# Patient Record
Sex: MALE | Race: WHITE | Employment: OTHER | URBAN - METROPOLITAN AREA
[De-identification: names, ages, dates, MRNs, and addresses within clinical notes are randomized per-mention and may not be internally consistent; named-entity substitution may affect disease eponyms.]

---

## 2018-02-25 ENCOUNTER — HOSPITAL ENCOUNTER (EMERGENCY)
Age: 78
Discharge: HOME | End: 2018-02-25
Attending: EMERGENCY MEDICINE
Payer: MEDICARE

## 2018-02-25 VITALS
HEART RATE: 64 BPM | RESPIRATION RATE: 16 BRPM | DIASTOLIC BLOOD PRESSURE: 95 MMHG | WEIGHT: 243.61 LBS | OXYGEN SATURATION: 95 % | BODY MASS INDEX: 31.28 KG/M2 | TEMPERATURE: 98.4 F | SYSTOLIC BLOOD PRESSURE: 178 MMHG

## 2018-02-25 DIAGNOSIS — H10.33 ACUTE CONJUNCTIVITIS OF BOTH EYES, UNSPECIFIED ACUTE CONJUNCTIVITIS TYPE: ICD-10-CM

## 2018-02-25 DIAGNOSIS — H04.301 DACRYOCYSTITIS OF RIGHT LACRIMAL SAC: Primary | ICD-10-CM

## 2018-02-25 DIAGNOSIS — S16.1XXA STRAIN OF NECK MUSCLE, INITIAL ENCOUNTER: ICD-10-CM

## 2018-02-25 PROCEDURE — 99283 EMERGENCY DEPT VISIT LOW MDM: CPT

## 2018-02-25 PROCEDURE — 74011250637 HC RX REV CODE- 250/637: Performed by: EMERGENCY MEDICINE

## 2018-02-25 RX ORDER — ERYTHROMYCIN 5 MG/G
OINTMENT OPHTHALMIC
Status: COMPLETED | OUTPATIENT
Start: 2018-02-25 | End: 2018-02-25

## 2018-02-25 RX ORDER — IBUPROFEN 600 MG/1
600 TABLET ORAL
Status: COMPLETED | OUTPATIENT
Start: 2018-02-25 | End: 2018-02-25

## 2018-02-25 RX ORDER — TAMSULOSIN HYDROCHLORIDE 0.4 MG/1
0.4 CAPSULE ORAL DAILY
COMMUNITY

## 2018-02-25 RX ORDER — CLINDAMYCIN HYDROCHLORIDE 300 MG/1
300 CAPSULE ORAL 4 TIMES DAILY
Qty: 28 CAP | Refills: 0 | Status: SHIPPED | OUTPATIENT
Start: 2018-02-25 | End: 2018-03-04

## 2018-02-25 RX ORDER — ERYTHROMYCIN 5 MG/G
OINTMENT OPHTHALMIC
Qty: 3.5 G | Refills: 0 | Status: SHIPPED | OUTPATIENT
Start: 2018-02-25

## 2018-02-25 RX ORDER — CLINDAMYCIN HYDROCHLORIDE 150 MG/1
300 CAPSULE ORAL
Status: COMPLETED | OUTPATIENT
Start: 2018-02-25 | End: 2018-02-25

## 2018-02-25 RX ORDER — METHOCARBAMOL 750 MG/1
750 TABLET, FILM COATED ORAL
Qty: 5 TAB | Refills: 0 | Status: SHIPPED | OUTPATIENT
Start: 2018-02-25 | End: 2018-03-02

## 2018-02-25 RX ADMIN — ERYTHROMYCIN: 5 OINTMENT OPHTHALMIC at 06:32

## 2018-02-25 RX ADMIN — IBUPROFEN 600 MG: 600 TABLET, FILM COATED ORAL at 06:41

## 2018-02-25 RX ADMIN — CLINDAMYCIN HYDROCHLORIDE 300 MG: 150 CAPSULE ORAL at 06:17

## 2018-02-25 NOTE — Clinical Note
- Dr. 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 
- Return to ED for fever, increased swelling, increased pain, any other concerns. - Motrin for neck pain. Robaxin for muscle relaxant. Do not drive if taking Robaxin.

## 2018-02-25 NOTE — DISCHARGE INSTRUCTIONS
DACRYOCYSTITIS  The lacrimal drainage system consists of the puncta (upper and lower within the eyelids) which are the opening to the upper and lower canaliculus. These meet at the common canaliculus and open into the lacrimal (tear) sac. This runs parallel to the nose and is  from the middle meatus of the nasal cavity by two thin plates of bone. It continues down to become the nasolacrimal duct (tear duct) which opens out into the inferior nasal meatus. Dacryocystitis[1]  Dacryocystitis is an inflammation of the lacrimal sac, often as a result of infection. It may be acute or chronic. For anatomical reasons, it occurs more frequently on the left side. An ocular origin for inflammation of the lacrimal system is less common than a nasal origin. Rarely, congenital dacryocystitis can occur. One study reported a birth prevalence of 1 in 3,884. [2]This is a serious condition because the orbital septum is poorly formed in infants and there is a significant risk of spread (orbital cellulitis and its complications). Epidemiology  It is more common in females. It tends to occur either in infants (uncommon) or in adults (much more common) over the age of 36 years, peak age 64-70 years. Presentation  Acute dacryocystitis  Symptoms and signs are over the region of the lacrimal sac (but may spread to the nose and face with teeth pain being experienced by some). Therefore, look just lateral and below the bridge of the nose for:   Excess tears (epiphora) - almost invariably. Pain. Redness. Swelling. The patient may complain of decreased visual acuity owing to the excess tears and an abnormal tear composition. [3]  Examination will reveal a tender, tense, red swelling (± preseptal cellulitis in severe cases). Mucopurulent discharge can be expressed from the punctum. There may be a fever and an elevated leukocyte count too.   Chronic dacryocystitis[4]   This may present with a history of chronic or recurring epiphora and may have persistent redness of the medial canthus. There may be a painless or recurring swelling over the lacrimal sac, and pressure over this will result in reflux of mucopurulent material through the lower punctum. Differential diagnosis  Orbital or facial cellulitis (discharge cannot be expressed from the punctum). Acute ethmoid or frontal sinusitis. Dacryocystocele (mild enlargement of a non-inflamed lacrimal sac in an infant). Investigations  In severe or atypical cases (eg, non-responsive to antibiotics), culture the expressed contents of the sac. CT scan of the orbit and the paranasal sinuses can be useful. A dacryocystography (DCG) may be performed where structural abnormalities are suspected. Associated diseases[3]  This is most commonly associated with nasolacrimal duct obstruction which results in stasis of the lacrimal sac contents. Less commonly, it is associated with anatomical abnormalities of the lacrimal sac or with nasal or sinus surgery. Nasal disease may be found in a number of these patients - eg, various forms of rhinitis, trauma or the presence of a foreign body. Rarely, there may be a lacrimal sac tumour. Management  Acute dacryocystitis management  Patients tend to be managed on an outpatient basis unless they are systemically unwell. Initially, treatment of acute dacryocystitis is with oral antibiotics and analgesia. Examples include:   Children - co-amoxiclav or cefaclor. [5]   Adults - co-amoxiclav or cefalexin (although be guided by microbiology results. Whilst Staphylococcus aureus is still the main cause, Gram-negative organisms are increasingly isolated). [6]  The regime is guided by clinical response but, typically, a 10- to 14-day course is required. Incision and drainage may be considered if the infection extends outside the sac and a superficial skin abscess is formed.  However, this carries the risk of forming a fistula, resulting in tears draining directly to the skin surface.[7]   Once the infection has settled and in chronic cases, a dacryocystorhinostomy (DCR) is performed. [8]  Chronic dacryocystitis management[9, 10]  Non-surgical treatment involves warm compresses, massage and probing of the nasolacrimal duct. Probing involves inserting a fine metal probe via the punctum and canalicular system and passing it into the nasolacrimal sac, past the obstruction. This can often be done without a general anaesthetic. If there is acute infection, the procedure is usually deferred for a few days, until antibiotics have taken effect. [11]   If this is not effective, surgical treatment is a DCR (see box below). Balloon dacryoplasty has become popular in the last few years, but may have lower success rates in the long term. It is suitable for patients with focal stenoses or occlusions of the nasolacrimal duct. [12]  Congenital dacryocystocele management[13]  One study reported that conservative management, involving gentle massage over the lacrimal sac, warm compresses ± topical and systemic antibiotics, was effective in all cases. Probing and irrigation may resolve the obstruction. Be aware that serious infection can develop rapidly       Pinkeye: Care Instructions  Your Care Instructions    Pinkeye is redness and swelling of the eye surface and the conjunctiva (the lining of the eyelid and the covering of the white part of the eye). Pinkeye is also called conjunctivitis. Pinkeye is often caused by infection with bacteria or a virus. Dry air, allergies, smoke, and chemicals are other common causes. Pinkeye often clears on its own in 7 to 10 days. Antibiotics only help if the pinkeye is caused by bacteria. Pinkeye caused by infection spreads easily. If an allergy or chemical is causing pinkeye, it will not go away unless you can avoid whatever is causing it. Follow-up care is a key part of your treatment and safety.  Be sure to make and go to all appointments, and call your doctor if you are having problems. It's also a good idea to know your test results and keep a list of the medicines you take. How can you care for yourself at home? · Wash your hands often. Always wash them before and after you treat pinkeye or touch your eyes or face. · Use moist cotton or a clean, wet cloth to remove crust. Wipe from the inside corner of the eye to the outside. Use a clean part of the cloth for each wipe. · Put cold or warm wet cloths on your eye a few times a day if the eye hurts. · Do not wear contact lenses or eye makeup until the pinkeye is gone. Throw away any eye makeup you were using when you got pinkeye. Clean your contacts and storage case. If you wear disposable contacts, use a new pair when your eye has cleared and it is safe to wear contacts again. · If the doctor gave you antibiotic ointment or eyedrops, use them as directed. Use the medicine for as long as instructed, even if your eye starts looking better soon. Keep the bottle tip clean, and do not let it touch the eye area. · To put in eyedrops or ointment:  ¨ Tilt your head back, and pull your lower eyelid down with one finger. ¨ Drop or squirt the medicine inside the lower lid. ¨ Close your eye for 30 to 60 seconds to let the drops or ointment move around. ¨ Do not touch the ointment or dropper tip to your eyelashes or any other surface. · Do not share towels, pillows, or washcloths while you have pinkeye. When should you call for help? Call your doctor now or seek immediate medical care if:  ? · You have pain in your eye, not just irritation on the surface. ? · You have a change in vision or loss of vision. ? · You have an increase in discharge from the eye.   ? · Your eye has not started to improve or begins to get worse within 48 hours after you start using antibiotics. ? · Pinkeye lasts longer than 7 days. ? Watch closely for changes in your health, and be sure to contact your doctor if you have any problems. Where can you learn more? Go to http://yonas-asher.info/. Enter Y392 in the search box to learn more about \"Marj: Care Instructions. \"  Current as of: March 20, 2017  Content Version: 11.4  © 0402-9618 [employer]. Care instructions adapted under license by [employer] (which disclaims liability or warranty for this information). If you have questions about a medical condition or this instruction, always ask your healthcare professional. [place] [address] any warranty or liability for your use of this information. Neck Strain: Care Instructions  Your Care Instructions    You have strained the muscles and ligaments in your neck. A sudden, awkward movement can strain the neck. This often occurs with falls or car accidents or during certain sports. Everyday activities like working on a computer or sleeping can also cause neck strain if they force you to hold your neck in an awkward position for a long time. It is common for neck pain to get worse for a day or two after an injury, but it should start to feel better after that. You may have more pain and stiffness for several days before it gets better. This is expected. It may take a few weeks or longer for it to heal completely. Good home treatment can help you get better faster and avoid future neck problems. Follow-up care is a key part of your treatment and safety. Be sure to make and go to all appointments, and call your doctor if you are having problems. It's also a good idea to know your test results and keep a list of the medicines you take. How can you care for yourself at home? · If you were given a neck brace (cervical collar) to limit neck motion, wear it as instructed for as many days as your doctor tells you to. Do not wear it longer than you were told to. Wearing a brace for too long can make neck stiffness worse and weaken the neck muscles.   · You can try using heat or ice to see if it helps. ¨ Try using a heating pad on a low or medium setting for 15 to 20 minutes every 2 to 3 hours. Try a warm shower in place of one session with the heating pad. You can also buy single-use heat wraps that last up to 8 hours. ¨ You can also try an ice pack for 10 to 15 minutes every 2 to 3 hours. · Take pain medicines exactly as directed. ¨ If the doctor gave you a prescription medicine for pain, take it as prescribed. ¨ If you are not taking a prescription pain medicine, ask your doctor if you can take an over-the-counter medicine. · Gently rub the area to relieve pain and help with blood flow. Do not massage the area if it hurts to do so. · Do not do anything that makes the pain worse. Take it easy for a couple of days. You can do your usual activities if they do not hurt your neck or put it at risk for more stress or injury. · Try sleeping on a special neck pillow. Place it under your neck, not under your head. Placing a tightly rolled-up towel under your neck while you sleep will also work. If you use a neck pillow or rolled towel, do not use your regular pillow at the same time. · To prevent future neck pain, do exercises to stretch and strengthen your neck and back. Learn how to use good posture, safe lifting techniques, and proper body mechanics. When should you call for help? Call 911 anytime you think you may need emergency care. For example, call if:  ? · You are unable to move an arm or a leg at all. ?Call your doctor now or seek immediate medical care if:  ? · You have new or worse symptoms in your arms, legs, chest, belly, or buttocks. Symptoms may include:  ¨ Numbness or tingling. ¨ Weakness. ¨ Pain. ? · You lose bladder or bowel control. ? Watch closely for changes in your health, and be sure to contact your doctor if:  ? · You are not getting better as expected. Where can you learn more? Go to http://dakota.info/.   Enter M253 in the search box to learn more about \"Neck Strain: Care Instructions. \"  Current as of: March 21, 2017  Content Version: 11.4  © 8213-0649 [employer]. Care instructions adapted under license by [employer] (which disclaims liability or warranty for this information). If you have questions about a medical condition or this instruction, always ask your healthcare professional. [place] [address] any warranty or liability for your use of this information.

## 2018-02-25 NOTE — ED PROVIDER NOTES
HPI Comments: The patient presents to the ED with R eye pain, swelling and drainage. Symptoms have been present for 3-4 days. He saw an ophthalmologist at a ball game last night and was told to go to the ED. He does have an appointment with [facility] tomorrow. He denies decreased vision. He notes purulent drainage from the eye. He has swelling to the medial eye. No meds were taken PTA. He wears glasses. He does not wear contacts. He also notes L sided neck strain \"in my trapezius\" beginning today. Pain is 6/10. No meds have been taken for pain. He drove himself to the ED. He has no other complaints. Patient is a 78 y.o. male presenting with eye discharge. The history is provided by the patient. Eye Drainage    Associated symptoms include discharge, eye redness and pain. Pertinent negatives include no photophobia. Past Medical History:   Diagnosis Date    Arthritis     CAD (coronary artery disease)     Stent in coronary artery    Chronic pain     Nausea & vomiting     Other ill-defined conditions(799.89)     High Cholesterol    Other ill-defined conditions(799.89)     Frequent Kidney Stones    Other ill-defined conditions(799.89)     Diverticulitis    Other ill-defined conditions(799.89)     Essential Tremor    Psychiatric disorder     Bipolar    Unspecified sleep apnea        Past Surgical History:   Procedure Laterality Date    CARDIAC SURG PROCEDURE UNLIST      Cardiac stent    HX ORTHOPAEDIC      Bilateral Knee replacement    HX OTHER SURGICAL      Surgical repair of renal artery    HX UROLOGICAL      Frequent lithotripsy    HX UROLOGICAL  2002    Percutaneous kidney stone removal         History reviewed. No pertinent family history. Social History     Social History    Marital status:      Spouse name: N/A    Number of children: N/A    Years of education: N/A     Occupational History    Not on file.      Social History Main Topics    Smoking status: Former Smoker    Smokeless tobacco: Not on file    Alcohol use No    Drug use: No    Sexual activity: Not on file     Other Topics Concern    Not on file     Social History Narrative         ALLERGIES: Levaquin [levofloxacin]    Review of Systems   Eyes: Positive for pain, discharge and redness. Negative for photophobia, itching and visual disturbance. Musculoskeletal: Positive for neck pain. Vitals:    02/25/18 0551   BP: (!) 178/95   Pulse: 64   Resp: 16   Temp: 98.4 °F (36.9 °C)   SpO2: 95%   Weight: 110.5 kg (243 lb 9.7 oz)            Physical Exam   Constitutional: He is oriented to person, place, and time. He appears well-developed and well-nourished. HENT:   Head: Normocephalic and atraumatic. Mouth/Throat: Oropharynx is clear and moist.   Eyes: EOM are normal. Pupils are equal, round, and reactive to light. Swelling, erythema, tenderness over lacrimal duct of R eye. Purulent drainage from R>>L eye. Conjunctiva of R eye is erythematous. Neck: Normal range of motion. Neck supple. Mild TTP over L trapezius. Cardiovascular: Normal rate, regular rhythm and normal heart sounds. Pulmonary/Chest: Effort normal and breath sounds normal.   Abdominal: Soft. Bowel sounds are normal. There is no tenderness. Musculoskeletal: Normal range of motion. He exhibits no edema or tenderness. Neurological: He is alert and oriented to person, place, and time. Skin: Skin is warm and dry. Psychiatric: He has a normal mood and affect. His behavior is normal.   Nursing note and vitals reviewed. [facility]      ED Course       Procedures                           CONSULT:  Dr. Nimo De Luna - will see patient in [place] office today at 2 pm.    A/P:  1. Dacryocystitis - Clindamycin. Planned on Vigamox, but hx anaphylaxis to Levofloxacin. Erythromycin ointment pending ophto eval. Eye patched at his request.  2. Neck strain - Motrin and Robaxin prn.    6:55 AM  Patient's results have been reviewed with them. Patient and/or family have verbally conveyed their understanding and agreement of the patient's signs, symptoms, diagnosis, treatment and prognosis and additionally agree to follow up as recommended or return to the Emergency Room should their condition change prior to follow-up. Discharge instructions have also been provided to the patient with some educational information regarding their diagnosis as well a list of reasons why they would want to return to the ER prior to their follow-up appointment should their condition change.

## 2018-02-25 NOTE — ED TRIAGE NOTES
Patient ambulatory to treatment area with personal cane. Patient states that his right eye started with drainage and \"irritation\" on either Tuesday or Wednesday. Patient states that he has been putting sty cream in his eye to help.

## 2018-02-25 NOTE — ED NOTES
Pt was discharged and given instructions by MD. Pt verbalized good understanding of all discharge instructions,prescriptions and F/U care. All questions answered. Pt in stable condition on discharge. Patient off unit prior to vitals.

## 2018-03-01 ENCOUNTER — APPOINTMENT (OUTPATIENT)
Dept: GENERAL RADIOLOGY | Age: 78
End: 2018-03-01
Attending: EMERGENCY MEDICINE
Payer: MEDICARE

## 2018-03-01 ENCOUNTER — HOSPITAL ENCOUNTER (EMERGENCY)
Age: 78
Discharge: HOME | End: 2018-03-01
Attending: EMERGENCY MEDICINE
Payer: MEDICARE

## 2018-03-01 VITALS
DIASTOLIC BLOOD PRESSURE: 97 MMHG | WEIGHT: 235 LBS | SYSTOLIC BLOOD PRESSURE: 159 MMHG | TEMPERATURE: 97.4 F | HEART RATE: 72 BPM | RESPIRATION RATE: 18 BRPM | BODY MASS INDEX: 30.17 KG/M2 | OXYGEN SATURATION: 96 %

## 2018-03-01 DIAGNOSIS — R07.89 CHEST WALL PAIN: Primary | ICD-10-CM

## 2018-03-01 LAB — TROPONIN I BLD-MCNC: <0.04 NG/ML (ref 0–0.08)

## 2018-03-01 PROCEDURE — 99283 EMERGENCY DEPT VISIT LOW MDM: CPT

## 2018-03-01 PROCEDURE — 84484 ASSAY OF TROPONIN QUANT: CPT

## 2018-03-01 PROCEDURE — 71046 X-RAY EXAM CHEST 2 VIEWS: CPT

## 2018-03-01 PROCEDURE — 93005 ELECTROCARDIOGRAM TRACING: CPT

## 2018-03-01 RX ORDER — METHOCARBAMOL 750 MG/1
750 TABLET, FILM COATED ORAL 3 TIMES DAILY
Qty: 10 TAB | Refills: 0 | Status: SHIPPED | OUTPATIENT
Start: 2018-03-01

## 2018-03-02 LAB
ATRIAL RATE: 71 BPM
CALCULATED P AXIS, ECG09: 116 DEGREES
CALCULATED R AXIS, ECG10: -38 DEGREES
CALCULATED T AXIS, ECG11: 25 DEGREES
DIAGNOSIS, 93000: NORMAL
P-R INTERVAL, ECG05: 214 MS
Q-T INTERVAL, ECG07: 432 MS
QRS DURATION, ECG06: 106 MS
QTC CALCULATION (BEZET), ECG08: 469 MS
VENTRICULAR RATE, ECG03: 71 BPM

## 2018-03-02 NOTE — DISCHARGE INSTRUCTIONS
Musculoskeletal Chest Pain: Care Instructions  Your Care Instructions    Chest pain is not always a sign that something is wrong with your heart or that you have another serious problem. The doctor thinks your chest pain is caused by strained muscles or ligaments, inflamed chest cartilage, or another problem in your chest, rather than by your heart. You may need more tests to find the cause of your chest pain. Follow-up care is a key part of your treatment and safety. Be sure to make and go to all appointments, and call your doctor if you are having problems. It's also a good idea to know your test results and keep a list of the medicines you take. How can you care for yourself at home? · Take pain medicines exactly as directed. ¨ If the doctor gave you a prescription medicine for pain, take it as prescribed. ¨ If you are not taking a prescription pain medicine, ask your doctor if you can take an over-the-counter medicine. · Rest and protect the sore area. · Stop, change, or take a break from any activity that may be causing your pain or soreness. · Put ice or a cold pack on the sore area for 10 to 20 minutes at a time. Try to do this every 1 to 2 hours for the next 3 days (when you are awake) or until the swelling goes down. Put a thin cloth between the ice and your skin. · After 2 or 3 days, apply a heating pad set on low or a warm cloth to the area that hurts. Some doctors suggest that you go back and forth between hot and cold. · Do not wrap or tape your ribs for support. This may cause you to take smaller breaths, which could increase your risk of lung problems. · Mentholated creams such as Bengay or Icy Hot may soothe sore muscles. Follow the instructions on the package. · Follow your doctor's instructions for exercising. · Gentle stretching and massage may help you get better faster. Stretch slowly to the point just before pain begins, and hold the stretch for at least 15 to 30 seconds.  Do this 3 or 4 times a day. Stretch just after you have applied heat. · As your pain gets better, slowly return to your normal activities. Any increased pain may be a sign that you need to rest a while longer. When should you call for help? Call 911 anytime you think you may need emergency care. For example, call if:  ? · You have chest pain or pressure. This may occur with:  ¨ Sweating. ¨ Shortness of breath. ¨ Nausea or vomiting. ¨ Pain that spreads from the chest to the neck, jaw, or one or both shoulders or arms. ¨ Dizziness or lightheadedness. ¨ A fast or uneven pulse. After calling 911, chew 1 adult-strength aspirin. Wait for an ambulance. Do not try to drive yourself. ? · You have sudden chest pain and shortness of breath, or you cough up blood. ?Call your doctor now or seek immediate medical care if:  ? · You have any trouble breathing. ? · Your chest pain gets worse. ? · Your chest pain occurs consistently with exercise and is relieved by rest.   ? Watch closely for changes in your health, and be sure to contact your doctor if:  ? · Your chest pain does not get better after 1 week. Where can you learn more? Go to http://yonas-asher.info/. Enter V293 in the search box to learn more about \"Musculoskeletal Chest Pain: Care Instructions. \"  Current as of: March 20, 2017  Content Version: 11.4  © 0939-3230 [employer]. Care instructions adapted under license by [employer] (which disclaims liability or warranty for this information). If you have questions about a medical condition or this instruction, always ask your healthcare professional. [address] any warranty or liability for your use of this information. We hope that we have addressed all of your medical concerns. The examination and treatment you received in the Emergency Department were for an emergent problem and were not intended as complete care.  It is important that you follow up with your healthcare provider(s) for ongoing care. If your symptoms worsen or do not improve as expected, and you are unable to reach your usual health care provider(s), you should return to the Emergency Department. Today's healthcare is undergoing tremendous change, and patient satisfaction surveys are one of the many tools to assess the quality of medical care. You may receive a survey from the [employer] regarding your experience in the Emergency Department. I hope that your experience has been completely positive, particularly the medical care that I provided. As such, please participate in the survey; anything less than excellent does not meet my expectations or intentions. [facility] and [facility] participate in nationally recognized quality of care measures. If your blood pressure is greater than 120/80, as reported below, we urge that you seek medical care to address the potential of high blood pressure, commonly known as hypertension. Hypertension can be hereditary or can be caused by certain medical conditions, pain, stress, or \"white coat syndrome. \"       Please make an appointment with your health care provider(s) for follow up of your Emergency Department visit. VITALS:   Patient Vitals for the past 8 hrs:   Temp Pulse Resp BP SpO2   03/01/18 2051 97.4 °F (36.3 °C) 72 18 (!) 159/97 96 %          Thank you for allowing us to provide you with medical care today. We realize that you have many choices for your emergency care needs. Please choose us in the future for any continued health care needs. Murali Hart, [address] [place] [address] 20.   Office: [phone]            Recent Results (from the past 24 hour(s))   EKG, 12 LEAD, INITIAL    Collection Time: 03/01/18  9:35 PM   Result Value Ref Range    Ventricular Rate 71 BPM    Atrial Rate 71 BPM    P-R Interval 214 ms    QRS Duration 106 ms    Q-T Interval 432 ms    QTC Calculation (Bezet) 469 ms    Calculated P Axis 116 degrees    Calculated R Axis -38 degrees    Calculated T Axis 25 degrees    Diagnosis       Sinus rhythm with 1st degree AV block  Left axis deviation  Pulmonary disease pattern  Abnormal ECG  No previous ECGs available     POC TROPONIN-I    Collection Time: 03/01/18  9:43 PM   Result Value Ref Range    Troponin-I (POC) <0.04 0.00 - 0.08 ng/mL       Xr Chest Pa Lat    Result Date: 3/1/2018  Indication:    Chest pain  Exam: PA and lateral views of the chest. Direct comparison is made to prior CXR dated . November 2009. Findings: Cardiomediastinal silhouette is within normal limits. There is mild elevation of the right hemidiaphragm. There is very mild bibasilar linear scarring, unchanged. There is no pleural fluid. There is no pneumothorax. IMPRESSION: Very mild bibasilar linear scarring.

## 2018-03-02 NOTE — ED NOTES
Patient approached nursing station asking about prescriptions upon discharge and if he could be medicated here. Patient advised by MD that she could not give him narcotics as he had driven himself but that he could have motrin or tylenol. Patient declined anti-inflammatories. Patient also advised that we were waiting on his medical records from [facility] since he had been previously seen there for similar symptoms and had stated that they \"did a full cardiac work up. \" Patient states, \"I don't care about that, I just want the prescriptions and I want to go home. You can't give me some of the pills here and then I can take them when I get home. Patient advised by this nurse and MD that with a cardiac history and where he described his pain, that we were checking to make sure his heart was okay. And that he was up for discharge and would received those instructions momentarily. Patient states, \"I'm not waiting for that. Don't give my the discharge instructions,\" and walked off the unit with steady gait, using his personal cane.

## 2018-03-02 NOTE — ED NOTES
Pt resting comfortably on stretcher in no acute distress. MD at bedside. Awaiting further MD orders. Call bell within reach; will continue to monitor.

## 2018-03-02 NOTE — ED TRIAGE NOTES
Pt was seen on Sunday, Feb. 25 for L shoulder pain. Pt wants to get a refill on the pain medication prescribed by Dr. Gunjan Tse. Pt reports that the pain has spread from his L shoulder to his head and neck worsening since his visit on Sunday.

## 2018-03-02 NOTE — ED PROVIDER NOTES
Patient is a 78 y.o. male presenting with neck pain and shoulder pain. Neck Pain    Associated symptoms include chest pain. Pertinent negatives include no numbness, no headaches and no weakness. Shoulder Pain    Pertinent negatives include no numbness. 77 yo WM presents for medication refill. Pt was prescribed robaxin in ED on 2/25 but was only given 5 tablets. Pt here for refill. Was seen at [facility] a few days ago for left shoulder pain and neck pain, was told to f/u with neurology and GI. Pt reports he had cardiac evaluation and CT of chest. Pt states the pain hurts worse in his left upper chest when he touches it. Pt doesn't think he was prescribed any medications from [facility].   Past Medical History:   Diagnosis Date    Arthritis     CAD (coronary artery disease)     Stent in coronary artery    Chronic pain     Nausea & vomiting     Other ill-defined conditions(799.89)     High Cholesterol    Other ill-defined conditions(799.89)     Frequent Kidney Stones    Other ill-defined conditions(799.89)     Diverticulitis    Other ill-defined conditions(799.89)     Essential Tremor    Psychiatric disorder     Bipolar    Unspecified sleep apnea        Past Surgical History:   Procedure Laterality Date    CARDIAC SURG PROCEDURE UNLIST      Cardiac stent    HX ORTHOPAEDIC      Bilateral Knee replacement    HX OTHER SURGICAL      Surgical repair of renal artery    HX UROLOGICAL      Frequent lithotripsy    HX UROLOGICAL  2002    Percutaneous kidney stone removal         History reviewed. No pertinent family history. Social History     Social History    Marital status:      Spouse name: N/A    Number of children: N/A    Years of education: N/A     Occupational History    Not on file.      Social History Main Topics    Smoking status: Former Smoker    Smokeless tobacco: Not on file    Alcohol use No    Drug use: No    Sexual activity: Not on file     Other Topics Concern    Not on file     Social History Narrative         ALLERGIES: Levaquin [levofloxacin]    Review of Systems   Constitutional: Negative for chills and fever. Respiratory: Negative for cough and shortness of breath. Cardiovascular: Positive for chest pain. Negative for leg swelling. Gastrointestinal: Negative for abdominal pain, constipation, diarrhea, nausea and vomiting. Genitourinary: Negative for dysuria and hematuria. Musculoskeletal: Positive for back pain and neck pain. Neurological: Negative for weakness, numbness and headaches. All other systems reviewed and are negative.       Vitals:    03/01/18 2051   BP: (!) 159/97   Pulse: 72   Resp: 18   Temp: 97.4 °F (36.3 °C)   SpO2: 96%   Weight: 106.6 kg (235 lb)            Physical Exam   Physical Examination: General appearance - alert, well appearing, and in no distress, oriented to person, place, and time and normal appearing weight  Eyes - pupils equal and reactive, extraocular eye movements intact  Neck - supple, no significant adenopathy  Chest - clear to auscultation, no wheezes, rales or rhonchi, symmetric air entry, nerve stimulator to left upper chest wall, mild tenderness to superior aspect of stimulator, no overlying erythema or warmth  Heart - normal rate, regular rhythm, normal S1, S2, no murmurs, rubs, clicks or gallops  Abdomen - soft, nontender, nondistended, no masses or organomegaly  Back exam - full range of motion, no tenderness, palpable spasm or pain on motion  Neurological - alert, oriented, normal speech, no focal findings or movement disorder noted  Musculoskeletal - no joint tenderness, deformity or swelling  Extremities - peripheral pulses normal, no pedal edema, no clubbing or cyanosis  Skin - normal coloration and turgor, no rashes, no suspicious skin lesions noted  MDM  Number of Diagnoses or Management Options     Amount and/or Complexity of Data Reviewed  Clinical lab tests: ordered and reviewed  Tests in the radiology section of CPT®: ordered and reviewed  Decide to obtain previous medical records or to obtain history from someone other than the patient: yes  Review and summarize past medical records: yes  Independent visualization of images, tracings, or specimens: yes    Patient Progress  Patient progress: stable        ED Course       Procedures    EKG interpretation: Final  Rhythm: normal sinus rhythm; and regular . Rate (approx.): 71; Axis: left axis deviation; VT interval: prolonged; QRS interval: normal ; ST/T wave: non-specific changes; 1st degree AVB    Pt requesting pain medication in ED, advised since he is driving we can write him a prescription. Pt requesting pills to take home. Advised him we cannot dispense medication. Awaiting records from [facility] where pt states he thinks he had a full cardiac evaluation. EKG without acute changes, troponin WNL, CXR WNL. Pain reproducible on exam. Pt left ED before receiving discharge instructions.

## 2018-03-02 NOTE — ED NOTES
Pt resting comfortably on stretcher in no acute distress. POC Troponin running at bedside. Call bell within reach; will continue to monitor.

## 2020-08-07 ENCOUNTER — HOSPITAL ENCOUNTER (OUTPATIENT)
Dept: LAB | Age: 80
Discharge: HOME | End: 2020-08-07
Payer: MEDICARE

## 2020-08-07 DIAGNOSIS — U07.1 ASYMPTOMATIC COVID-19 VIRUS INFECTION: ICD-10-CM

## 2020-08-07 PROCEDURE — 87635 SARS-COV-2 COVID-19 AMP PRB: CPT

## 2020-08-08 LAB — SARS-COV-2, COV2NT: NOT DETECTED

## 2020-08-11 ENCOUNTER — ANESTHESIA EVENT (OUTPATIENT)
Dept: ENDOSCOPY | Age: 80
End: 2020-08-11
Payer: MEDICARE

## 2020-08-11 ENCOUNTER — HOSPITAL ENCOUNTER (OUTPATIENT)
Age: 80
Setting detail: OUTPATIENT SURGERY
Discharge: HOME | End: 2020-08-11
Attending: INTERNAL MEDICINE | Admitting: INTERNAL MEDICINE
Payer: MEDICARE

## 2020-08-11 ENCOUNTER — ANESTHESIA (OUTPATIENT)
Dept: ENDOSCOPY | Age: 80
End: 2020-08-11
Payer: MEDICARE

## 2020-08-11 VITALS
DIASTOLIC BLOOD PRESSURE: 84 MMHG | HEIGHT: 74 IN | WEIGHT: 228.62 LBS | OXYGEN SATURATION: 96 % | SYSTOLIC BLOOD PRESSURE: 125 MMHG | TEMPERATURE: 98 F | BODY MASS INDEX: 29.34 KG/M2 | RESPIRATION RATE: 18 BRPM | HEART RATE: 59 BPM

## 2020-08-11 PROCEDURE — 88305 TISSUE EXAM BY PATHOLOGIST: CPT

## 2020-08-11 PROCEDURE — 76040000019: Performed by: INTERNAL MEDICINE

## 2020-08-11 PROCEDURE — 77030013992 HC SNR POLYP ENDOSC BSC -B: Performed by: INTERNAL MEDICINE

## 2020-08-11 PROCEDURE — 76060000031 HC ANESTHESIA FIRST 0.5 HR: Performed by: INTERNAL MEDICINE

## 2020-08-11 PROCEDURE — 74011250636 HC RX REV CODE- 250/636: Performed by: NURSE ANESTHETIST, CERTIFIED REGISTERED

## 2020-08-11 PROCEDURE — 74011250636 HC RX REV CODE- 250/636: Performed by: INTERNAL MEDICINE

## 2020-08-11 RX ORDER — ATROPINE SULFATE 0.1 MG/ML
0.4 INJECTION INTRAVENOUS
Status: DISCONTINUED | OUTPATIENT
Start: 2020-08-11 | End: 2020-08-11 | Stop reason: HOSPADM

## 2020-08-11 RX ORDER — MIDAZOLAM HYDROCHLORIDE 1 MG/ML
.25-5 INJECTION, SOLUTION INTRAMUSCULAR; INTRAVENOUS
Status: DISCONTINUED | OUTPATIENT
Start: 2020-08-11 | End: 2020-08-11 | Stop reason: HOSPADM

## 2020-08-11 RX ORDER — SODIUM CHLORIDE 9 MG/ML
INJECTION, SOLUTION INTRAVENOUS
Status: DISCONTINUED | OUTPATIENT
Start: 2020-08-11 | End: 2020-08-11 | Stop reason: HOSPADM

## 2020-08-11 RX ORDER — DEXTROMETHORPHAN/PSEUDOEPHED 2.5-7.5/.8
1.2 DROPS ORAL
Status: DISCONTINUED | OUTPATIENT
Start: 2020-08-11 | End: 2020-08-11 | Stop reason: HOSPADM

## 2020-08-11 RX ORDER — PROPOFOL 10 MG/ML
INJECTION, EMULSION INTRAVENOUS
Status: DISCONTINUED | OUTPATIENT
Start: 2020-08-11 | End: 2020-08-11 | Stop reason: HOSPADM

## 2020-08-11 RX ORDER — SODIUM CHLORIDE 9 MG/ML
50 INJECTION, SOLUTION INTRAVENOUS CONTINUOUS
Status: DISCONTINUED | OUTPATIENT
Start: 2020-08-11 | End: 2020-08-11 | Stop reason: HOSPADM

## 2020-08-11 RX ORDER — EPINEPHRINE 0.1 MG/ML
1 INJECTION INTRACARDIAC; INTRAVENOUS
Status: DISCONTINUED | OUTPATIENT
Start: 2020-08-11 | End: 2020-08-11 | Stop reason: HOSPADM

## 2020-08-11 RX ORDER — NALOXONE HYDROCHLORIDE 0.4 MG/ML
0.4 INJECTION, SOLUTION INTRAMUSCULAR; INTRAVENOUS; SUBCUTANEOUS
Status: DISCONTINUED | OUTPATIENT
Start: 2020-08-11 | End: 2020-08-11 | Stop reason: HOSPADM

## 2020-08-11 RX ORDER — FLUMAZENIL 0.1 MG/ML
0.2 INJECTION INTRAVENOUS
Status: DISCONTINUED | OUTPATIENT
Start: 2020-08-11 | End: 2020-08-11 | Stop reason: HOSPADM

## 2020-08-11 RX ADMIN — PROPOFOL 200 MCG/KG/MIN: 10 INJECTION, EMULSION INTRAVENOUS at 14:10

## 2020-08-11 RX ADMIN — SODIUM CHLORIDE 50 ML/HR: 900 INJECTION, SOLUTION INTRAVENOUS at 13:45

## 2020-08-11 RX ADMIN — SODIUM CHLORIDE: 9 INJECTION, SOLUTION INTRAVENOUS at 13:50

## 2020-08-11 NOTE — PERIOP NOTES
1415  Anesthesia staff at patient's bedside administering anesthesia and monitoring patients vital signs throughout procedure. See anesthesia note. 26  Endoscope was pre-cleaned at bedside immediately following procedure by Ihsan Torres RN, endo tech. 1441  Patient tolerated procedure without problems. Abdomen soft and patient arousable and voices no complaints Report received from CRNA, see anesthesia note. Patient transported to endoscopy recovery area. Report given to Bharath Garcia RN.

## 2020-08-11 NOTE — ANESTHESIA PREPROCEDURE EVALUATION
Relevant Problems   No relevant active problems       Anesthetic History     PONV          Review of Systems / Medical History      Pulmonary        Sleep apnea           Neuro/Psych         Psychiatric history     Cardiovascular              CAD, cardiac stents and hyperlipidemia         GI/Hepatic/Renal         Renal disease: stones       Endo/Other        Arthritis     Other Findings   Comments: Chronic pain  Diverticulitis   Essential Tremor -brain stimulator   Bipolar           Physical Exam    Airway  Mallampati: II  TM Distance: 4 - 6 cm  Neck ROM: normal range of motion   Mouth opening: Normal     Cardiovascular  Regular rate and rhythm,  S1 and S2 normal,  no murmur, click, rub, or gallop  Rhythm: regular  Rate: normal         Dental  No notable dental hx       Pulmonary  Breath sounds clear to auscultation               Abdominal  GI exam deferred       Other Findings            Anesthetic Plan    ASA: 3  Anesthesia type: MAC            Anesthetic plan and risks discussed with: Patient

## 2020-08-11 NOTE — PROGRESS NOTES
Endoscopy discharge instructions have been reviewed and given to spouse. The spouse verbalized understanding and acceptance of instructions. Dr. Julianna Linares  discussed with patient procedure findings and next steps. Written discharge instructions were given at bedside with the patient.

## 2020-08-11 NOTE — H&P
The patient is a 70year old male who presents with a complaint of Abdominal Pain. Note for \"Abdominal Pain\": Mr Bryan Hernandez is a 70year old male complaining of RUQ abdominal pain.  He reports the pain has been intermittent for roughly the past 2 years.  The pain has a rapid onset lasting a few minutes before self resolving.  It has occurred approx 20 times in the past year and is stable in nature.  Twisting movements seem to make it worse while deep breaths seem to help.  He reports that when the pain occurs he will have a golf ball sized protrusion at the site of pain.   No change in BM noted.  No blood in stool.  No weight loss noted. EGD 7/26/12 mild antral gastritis and duodenitis, small hiatal hernia  Colonoscopy 7/26/12 pan diverticulitis, polyp, internal hemorrhoids  Chest x ray 2/27/18 normal  CTA Chest 2/27/18 negative for GI findings      Problem List/Past Medical (Azucena L. Plaskett; 6/4/2020 10:41 AM)  Osteoarthritis    Kidney Stone    Essential Tremor    Anemia    Degenerative Joint Disease    Hx of Hallucinations    Acid reflux    Hypoxia    Arthritis    Bipolar Disorder    Nephrolithiasis    Hypercholesterolemia    Sleep Apnea   with CPAP  Hiatal Hernia    CAD    Diverticulosis of colon without hemorrhage (562.10  K57.30)    Abnormal AST and ALT (790.4  R74.8)    Dilation of biliary tract (576.8  K83. 8)    Gallstones (574.20  K80.20)    Blood in stool (578.1  K92.1)    History of colonic polyps (V12.72  Z86.010)    Anemia (285.9  D64.9)    Chest pain (786.50  R07.9)      Past Surgical History (Azucena L. Plaskett; 6/4/2020 10:41 AM)  Cholecystectomy    Deep Brain Stimulation to treat essential tremor    Kidney stones removed    Tonsillectomy    lithotripsy    Bilateral Knee replacement    stent placement  [2006]:  CORONARY STENT PLACEMENT (32477)  [2006]: Allergies (Azucena L. Plaskett; 6/4/2020 10:41 AM)  Levaquin      Medication History (Azucena L.  Plaskett; 6/4/2020 10:41 AM)  Tamsulosin HCl  (0.4MG Capsule, 1 Oral daily) Active. Aspirin EC Low Strength  (81MG Tablet DR, Oral daily) Active. Simvastatin  (40MG Tablet, Oral daily) Active. Lithium Carbonate  (300MG Tablet, 1 Oral bid) Active. Family History (Azucena Shipman; 6/4/2020 10:41 AM)  Pacemaker   Father  Congestive Heart Failure   Father  Arthritis   Family Members In General    Social History ([place]; 6/4/2020 10:41 AM)  Tobacco Use   Smokes a pipe  Employment status   Retired  Marital status     Blood Transfusion   No  No alcohol use      Diagnostic Studies History (Azucena Shipman; 6/4/2020 10:41 AM)  Endoscopy  [04/2010]:  Colonoscopy  [04/2010]:    Health Maintenance History (Azucena Shipman; 6/4/2020 10:41 AM)  Flu Vaccine  [11/2017]:  Pneumovax   none        Review of Systems (Azucena Shipman; 6/4/2020 10:41 AM)  General Not Present- Chronic Fatigue, Poor Appetite, Weight Gain and Weight Loss. Skin Not Present- Itching, Rash and Skin Color Changes. HEENT Not Present- Hearing Loss and Vertigo. Respiratory Not Present- Difficulty Breathing and TB exposure. Cardiovascular Not Present- Chest Pain, Use of Antibiotics before Dental Procedures and Use of Blood Thinners. Gastrointestinal Present- See HPI. Musculoskeletal Not Present- Arthritis, Hip Replacement Surgery and Knee Replacement Surgery. Neurological Not Present- Weakness. Psychiatric Not Present- Depression. Endocrine Not Present- Diabetes and Thyroid Problems. Hematology Not Present- Anemia. Vitals (Azucena Shipman; 6/4/2020 10:42 AM)  6/4/2020 10:41 AM  Weight: 218 lb   Height: 74 in   Weight was reported by patient. Height was reported by patient. Body Surface Area: 2.25 m²   Body Mass Index: 27.99 kg/m²                Physical Exam (Hiwot Matias [facility]; 6/4/2020 11:36 AM)  General  Note:  Speech and affect appropriate. No distress noted. Assessment & Plan ([facility].  Fl[place]io [facility]; 6/4/2020 11:35 AM)  RUQ pain (789.01 R10.11)  Story: Pain has been intermittent for roughly the past 2 years. The pain has a rapid onset lasting a few minutes before self resolving. It has occurred approx 20 times in the past year and is stable in nature. Twisting movements seem to make it worse while deep breaths seem to help. He reports that when the pain occurs he will have a golf ball sized protrusion at the site of pain. EGD 7/26/12 mild antral gastritis and duodenitis, small hiatal hernia  Colonoscopy 7/26/12 pan diverticulitis, polyp, internal hemorrhoids  Chest x ray 2/27/18 normal  CTA Chest 2/27/18 negative for GI findings  Impression: Phone encounter    I suspect symptoms are caused by a hernia. Will proceed with CT for further evaluation. We discussed that not every hernia requires surgical intervention but if tissue becomes strangulated it may be an emergency. He will go to the ER if his pain returns and last more than a few minutes. Current Plans  Due to this being a TeleHealth Phone Call encounter (During UOKQD-39 public health emergency), evaluation of the following organ systems was limited: Vitals/Constitutional/EENT/Resp/CV/GI//MS/Neuro/Skin/Heme-Lymph-Imm. Pursuant to the emergency declaration under the [address] [place] [address] and the [employer] and [employer] Act, this Phone Call Visit was conducted with patient's (and/or legal guardian's) consent, to reduce the risk of exposure to COVID-19 and provide necessary medical care. Services were provided through a phone call discussion to substitute for in-person encounter. CT ABDOMEN W CON (51183)  History of colonic polyps (V12.72  Z86.010)  Impression: Last had colonoscopy in 2012 and was found to have sessile tubular adenomatous polyp. He was recommended to follow up in 2015, but did not do so. There is no family history of colon polyps or colon cancer.  He was also advised a colonoscopy in 2018 which he did not complete. Colonoscopy strongly advised  Current Plans  Colonoscopy (11246) (Discussed risks and benefits with the patient to include:; perforation, post polypectomy, or post biopsy bleeding, missed lesions, and sedation reactions.)  Started Suprep Bowel Prep Kit 17.5-3.13-1.6GM/177ML, 1 (one) KIt container Milliliter as directed, 364 Milliliter, 06/04/2020, No Refill. Pt Education - How to access health information online: discussed with patient and provided information. Patient is to call me for any questions or concerns. Follow up in 6-8 weeks: discussed with patient and provided information.   Signed electronically by LAWRENCE Abrams (6/4/2020 11:36 AM)

## 2020-08-11 NOTE — PROGRESS NOTES
Marcella Rao.  1940  [id number]    Situation:  Verbal report received from:   Niko Juarez RN  Procedure: Procedure(s):  COLONOSCOPY  ENDOSCOPIC POLYPECTOMY    Background:    Preoperative diagnosis: REPEAT FOR HISTORY OF COLON POLYPS  Postoperative diagnosis: Diverticulosis, Polyps, hemorrhoids. :  Dr. Leighann Jones  Assistant(s): Endoscopy RN-1: Maryan Mir RN  Endoscopy RN-2: Julia Castro RN    Specimens:   ID Type Source Tests Collected by Time Destination   1 : transverse colon polyps Preservative Colon, Transverse  Erich Santamaria MD 8/11/2020 1425 Pathology     H. Pylori  no    Assessment:  Intra-procedure medications       Anesthesia gave intra-procedure sedation and medications, see anesthesia flow sheet yes    Intravenous fluids: NS@ KVO     Vital signs stable   yes    Abdominal assessment: round and soft   yes    Recommendation:  Discharge patient per MD order  yes.   Return to floor  outpatient  Family or Friend   spouse  Permission to share finding with family or friend yes

## 2020-08-11 NOTE — DISCHARGE INSTRUCTIONS
[address] [facility]. Pilar Weiss M.D.  [phone]            COLON DISCHARGE INSTRUCTIONS       2020    Marcella Rao. :  1940  [place] Medical Record Number:  [id number]      COLONOSCOPY FINDINGS:  Your colonoscopy showed: two diminutive polyps that were removed and sent to pathology, diverticulosis seen scattered through the colon, Most likely cause of your symptoms. DISCOMFORT:  Redness at IV site- apply warm compress to area; if redness or soreness persist- contact your physician  There may be a slight amount of blood passed from the rectum  Gaseous discomfort- walking, belching will help relieve any discomfort  You may not operate a vehicle for 12 hours  You may not engage in an occupation involving machinery or appliances for rest of today  You may not drink alcoholic beverages for at least 12 hours  Avoid making any critical decisions for at least 24 hour  DIET:   High fiber diet. - however -  remember your colon is empty and a heavy meal will produce gas. Avoid these foods:  vegetables, fried / greasy foods, carbonated drinks for today     ACTIVITY:  You may resume your normal daily activities it is recommended that you spend the remainder of the day resting -  avoid any strenuous activity. CALL M.D. ANY SIGN OF:   Increasing pain, nausea, vomiting  Abdominal distension (swelling)  New increased bleeding (oral or rectal)  Fever (chills)  Pain in chest area  Bloody discharge from nose or mouth   Shortness of breath    Follow-up Instructions:   Call Dr. Leighann Jones if any questions or problems. Telephone # [phone]  Biopsy results will be available in  5 to 7 days  Should have a repeat colonoscopy in 5 years. Take a fiber supplement daily and increase daily fluid intake to facilitate bowel movements.

## 2020-08-11 NOTE — PROCEDURES
Wilson Huang M.D.  [phone]            2020          Colonoscopy Operative Report  Maryann Mcbride. :  1940  [place] Medical Record Number:  [id number]      Indications:    RUQ pain     :  Arben Flores MD    Referring Provider: Gabriela Riojas MD    Sedation:  MAC anesthesia    Pre-Procedural Exam:      Airway: clear,  No airway problems anticipated  Heart: RRR, without gallops or rubs  Lungs: clear bilaterally without wheezes, crackles, or rhonchi  Abdomen: soft, nontender, nondistended, bowel sounds present  Mental Status: awake, alert and oriented to person, place and time     Procedure Details:  After informed consent was obtained with all risks and benefits of procedure explained and preoperative exam completed, the patient was taken to the endoscopy suite and placed in the left lateral decubitus position. Upon sequential sedation as per above, a digital rectal exam was performed. The Olympus videocolonoscope  was inserted in the rectum and carefully advanced to the cecum, which was identified by the ileocecal valve and appendiceal orifice. The quality of preparation was good. The colonoscope was slowly withdrawn with careful inspection and evaluation between folds. Retroflexion in the rectum was performed. Findings:   Terminal Ileum: not intubated  Cecum: normal  Ascending Colon: normal, mild diverticulosis noted in the distal ascending colon and around the hepatic flexure. No diverticulitis seen. Transverse Colon: 2  Sessile polyp(s), the largest 2 mm in size; Descending Colon: no mucosal lesion appreciated  moderate diverticulosis; Sigmoid: no mucosal lesion appreciated  moderate diverticulosis; Rectum: no mucosal lesion appreciated  Grade 1 internal hemorrhoid(s);     Interventions:  2 complete polypectomy were performed using cold biopsy forceps and the polyps were  retrieved    Specimen Removed:  specimen #1, 3 mm in size, located in the transverse colon removed by cold biopsy and sent for pathology    Complications: None. EBL:  None. Impression:  Two diminutive polyps removed                          Scattered diverticulosis throughout the colon                          Small internal hemorrhoids    Recommendations:  -If adenoma is present, repeat colonoscopy in 5 years will be based on health status and if willing to undergo colonoscopy. -High fiber diet and daily fiber supplements, miralax as needed to avoid any constipation.  -Resume normal medication(s). Discharge Disposition:  Home in the company of a  when able to ambulate.     Theresa Elam MD  8/11/2020  2:43 PM

## 2020-08-11 NOTE — ANESTHESIA POSTPROCEDURE EVALUATION
Procedure(s):  COLONOSCOPY  ENDOSCOPIC POLYPECTOMY. MAC    Anesthesia Post Evaluation      Multimodal analgesia: multimodal analgesia used between 6 hours prior to anesthesia start to PACU discharge  Patient location during evaluation: PACU  Patient participation: complete - patient participated  Level of consciousness: awake and alert  Airway patency: patent  Anesthetic complications: no  Cardiovascular status: acceptable  Respiratory status: acceptable  Hydration status: acceptable        INITIAL Post-op Vital signs:   Vitals Value Taken Time   /73 8/11/2020  2:48 PM   Temp     Pulse 57 8/11/2020  2:52 PM   Resp 19 8/11/2020  2:52 PM   SpO2 95 % 8/11/2020  2:52 PM   Vitals shown include unvalidated device data.

## 2023-05-16 RX ORDER — ERYTHROMYCIN 5 MG/G
OINTMENT OPHTHALMIC
COMMUNITY
Start: 2018-02-25

## 2023-05-16 RX ORDER — METHOCARBAMOL 750 MG/1
TABLET, FILM COATED ORAL 3 TIMES DAILY
COMMUNITY
Start: 2018-03-01

## 2023-05-16 RX ORDER — TAMSULOSIN HYDROCHLORIDE 0.4 MG/1
0.4 CAPSULE ORAL DAILY
COMMUNITY

## 2023-05-16 RX ORDER — SIMVASTATIN 40 MG
TABLET ORAL
COMMUNITY

## 2023-05-16 RX ORDER — LITHIUM CARBONATE 300 MG/1
CAPSULE ORAL DAILY
COMMUNITY

## 2024-11-25 ENCOUNTER — TRANSCRIBE ORDERS (OUTPATIENT)
Facility: HOSPITAL | Age: 84
End: 2024-11-25

## 2024-11-25 DIAGNOSIS — S32.010A COMPRESSION FRACTURE OF L1 VERTEBRA, INITIAL ENCOUNTER (HCC): Primary | ICD-10-CM

## 2024-11-27 ENCOUNTER — HOSPITAL ENCOUNTER (OUTPATIENT)
Facility: HOSPITAL | Age: 84
Discharge: HOME | End: 2024-11-30
Attending: PHYSICAL MEDICINE & REHABILITATION

## 2024-11-27 DIAGNOSIS — S32.010A COMPRESSION FRACTURE OF L1 VERTEBRA, INITIAL ENCOUNTER (HCC): ICD-10-CM

## 2024-12-17 ENCOUNTER — HOSPITAL ENCOUNTER (OUTPATIENT)
Facility: HOSPITAL | Age: 84
Discharge: HOME | End: 2024-12-20
Attending: PHYSICAL MEDICINE & REHABILITATION
Payer: MEDICARE

## 2024-12-17 PROCEDURE — 72148 MRI LUMBAR SPINE W/O DYE: CPT

## (undated) DEVICE — SNARE ENDOSCP M L240CM W27MM SHTH DIA2.4MM CHN 2.8MM OVL

## (undated) DEVICE — 3M™ CUROS™ DISINFECTING CAP FOR NEEDLELESS CONNECTORS 270/CARTON 20 CARTONS/CASE CFF1-270: Brand: CUROS™

## (undated) DEVICE — CANN NASAL O2 CAPNOGRAPHY AD -- FILTERLINE

## (undated) DEVICE — BAG BELONG PT PERS CLEAR HANDL

## (undated) DEVICE — SIMPLICITY FLUFF UNDERPAD 23X36, MODERATE: Brand: SIMPLICITY

## (undated) DEVICE — CONTAINER SPEC 20 ML LID NEUT BUFF FORMALIN 10 % POLYPR STS

## (undated) DEVICE — POLYP TRAP: Brand: TRAPEASE®

## (undated) DEVICE — ADULT SPO2 SENSOR: Brand: NELLCOR

## (undated) DEVICE — BAG SPEC BIOHZRD 10 X 10 IN --

## (undated) DEVICE — Device

## (undated) DEVICE — SOLIDIFIER MEDC 1200ML -- CONVERT TO 356117

## (undated) DEVICE — KIT COLON W/ 1.1OZ LUB AND 2 END

## (undated) DEVICE — ELECTRODE,RADIOTRANSLUCENT,FOAM,3PK: Brand: MEDLINE

## (undated) DEVICE — SET GRAV CK VLV NEEDLESS ST 3 GANGED 4WAY STPCOCK HI FLO 10

## (undated) DEVICE — CATH IV AUTOGRD BC BLU 22GA 25 -- INSYTE

## (undated) DEVICE — SYRINGE 50ML E/T

## (undated) DEVICE — 1200 GUARD II KIT W/5MM TUBE W/O VAC TUBE: Brand: GUARDIAN